# Patient Record
Sex: FEMALE | Race: OTHER | Employment: FULL TIME | URBAN - METROPOLITAN AREA
[De-identification: names, ages, dates, MRNs, and addresses within clinical notes are randomized per-mention and may not be internally consistent; named-entity substitution may affect disease eponyms.]

---

## 2024-07-10 LAB
ABO, EXTERNAL RESULT: NORMAL
C. TRACHOMATIS, EXTERNAL RESULT: NORMAL
HEP B, EXTERNAL RESULT: NORMAL
HIV, EXTERNAL RESULT: NORMAL
N. GONORRHOEAE, EXTERNAL RESULT: NORMAL
RH FACTOR, EXTERNAL RESULT: POSITIVE
RPR, EXTERNAL RESULT: NORMAL
RUBELLA TITER, EXTERNAL RESULT: NORMAL

## 2024-07-28 ENCOUNTER — HOSPITAL ENCOUNTER (EMERGENCY)
Facility: HOSPITAL | Age: 29
Discharge: HOME | End: 2024-07-29
Attending: STUDENT IN AN ORGANIZED HEALTH CARE EDUCATION/TRAINING PROGRAM
Payer: OTHER GOVERNMENT

## 2024-07-28 DIAGNOSIS — R11.2 NAUSEA AND VOMITING, UNSPECIFIED VOMITING TYPE: Primary | ICD-10-CM

## 2024-07-28 LAB
ALBUMIN SERPL-MCNC: 3.1 G/DL (ref 3.5–5)
ALBUMIN/GLOB SERPL: 0.7 (ref 1.1–2.2)
ALP SERPL-CCNC: 75 U/L (ref 45–117)
ALT SERPL-CCNC: 24 U/L (ref 12–78)
ANION GAP SERPL CALC-SCNC: 10 MMOL/L (ref 5–15)
AST SERPL W P-5'-P-CCNC: 47 U/L (ref 15–37)
BASOPHILS # BLD: 0 K/UL (ref 0–0.1)
BASOPHILS NFR BLD: 0 % (ref 0–1)
BILIRUB SERPL-MCNC: 0.5 MG/DL (ref 0.2–1)
BUN SERPL-MCNC: 8 MG/DL (ref 6–20)
BUN/CREAT SERPL: 16 (ref 12–20)
CA-I BLD-MCNC: 9.2 MG/DL (ref 8.5–10.1)
CHLORIDE SERPL-SCNC: 107 MMOL/L (ref 97–108)
CO2 SERPL-SCNC: 18 MMOL/L (ref 21–32)
CREAT SERPL-MCNC: 0.5 MG/DL (ref 0.55–1.02)
DIFFERENTIAL METHOD BLD: ABNORMAL
EOSINOPHIL # BLD: 0 K/UL (ref 0–0.4)
EOSINOPHIL NFR BLD: 0 % (ref 0–7)
ERYTHROCYTE [DISTWIDTH] IN BLOOD BY AUTOMATED COUNT: 13.1 % (ref 11.5–14.5)
GLOBULIN SER CALC-MCNC: 4.2 G/DL (ref 2–4)
GLUCOSE SERPL-MCNC: 97 MG/DL (ref 65–100)
HCT VFR BLD AUTO: 38.4 % (ref 35–47)
HGB BLD-MCNC: 12.9 G/DL (ref 11.5–16)
IMM GRANULOCYTES # BLD AUTO: 0 K/UL (ref 0–0.04)
IMM GRANULOCYTES NFR BLD AUTO: 0 % (ref 0–0.5)
LIPASE SERPL-CCNC: 14 U/L (ref 13–75)
LYMPHOCYTES # BLD: 0.3 K/UL (ref 0.8–3.5)
LYMPHOCYTES NFR BLD: 4 % (ref 12–49)
MAGNESIUM SERPL-MCNC: 2.1 MG/DL (ref 1.6–2.4)
MCH RBC QN AUTO: 28.4 PG (ref 26–34)
MCHC RBC AUTO-ENTMCNC: 33.6 G/DL (ref 30–36.5)
MCV RBC AUTO: 84.6 FL (ref 80–99)
MONOCYTES # BLD: 0.8 K/UL (ref 0–1)
MONOCYTES NFR BLD: 9 % (ref 5–13)
NEUTS SEG # BLD: 7.4 K/UL (ref 1.8–8)
NEUTS SEG NFR BLD: 87 % (ref 32–75)
NRBC # BLD: 0 K/UL (ref 0–0.01)
NRBC BLD-RTO: 0 PER 100 WBC
PLATELET # BLD AUTO: 226 K/UL (ref 150–400)
PMV BLD AUTO: 11.4 FL (ref 8.9–12.9)
POTASSIUM SERPL-SCNC: 4.7 MMOL/L (ref 3.5–5.1)
PROT SERPL-MCNC: 7.3 G/DL (ref 6.4–8.2)
RBC # BLD AUTO: 4.54 M/UL (ref 3.8–5.2)
SODIUM SERPL-SCNC: 135 MMOL/L (ref 136–145)
WBC # BLD AUTO: 8.5 K/UL (ref 3.6–11)

## 2024-07-28 PROCEDURE — 83690 ASSAY OF LIPASE: CPT

## 2024-07-28 PROCEDURE — 36415 COLL VENOUS BLD VENIPUNCTURE: CPT

## 2024-07-28 PROCEDURE — 6370000000 HC RX 637 (ALT 250 FOR IP): Performed by: STUDENT IN AN ORGANIZED HEALTH CARE EDUCATION/TRAINING PROGRAM

## 2024-07-28 PROCEDURE — 80053 COMPREHEN METABOLIC PANEL: CPT

## 2024-07-28 PROCEDURE — 83735 ASSAY OF MAGNESIUM: CPT

## 2024-07-28 PROCEDURE — 96374 THER/PROPH/DIAG INJ IV PUSH: CPT

## 2024-07-28 PROCEDURE — 96375 TX/PRO/DX INJ NEW DRUG ADDON: CPT

## 2024-07-28 PROCEDURE — 6360000002 HC RX W HCPCS: Performed by: STUDENT IN AN ORGANIZED HEALTH CARE EDUCATION/TRAINING PROGRAM

## 2024-07-28 PROCEDURE — 96361 HYDRATE IV INFUSION ADD-ON: CPT

## 2024-07-28 PROCEDURE — 2580000003 HC RX 258: Performed by: STUDENT IN AN ORGANIZED HEALTH CARE EDUCATION/TRAINING PROGRAM

## 2024-07-28 PROCEDURE — 85025 COMPLETE CBC W/AUTO DIFF WBC: CPT

## 2024-07-28 PROCEDURE — 99284 EMERGENCY DEPT VISIT MOD MDM: CPT

## 2024-07-28 RX ORDER — 0.9 % SODIUM CHLORIDE 0.9 %
500 INTRAVENOUS SOLUTION INTRAVENOUS ONCE
Status: COMPLETED | OUTPATIENT
Start: 2024-07-28 | End: 2024-07-29

## 2024-07-28 RX ORDER — METOCLOPRAMIDE HYDROCHLORIDE 5 MG/ML
10 INJECTION INTRAMUSCULAR; INTRAVENOUS ONCE
Status: COMPLETED | OUTPATIENT
Start: 2024-07-28 | End: 2024-07-28

## 2024-07-28 RX ORDER — ONDANSETRON 2 MG/ML
4 INJECTION INTRAMUSCULAR; INTRAVENOUS ONCE
Status: COMPLETED | OUTPATIENT
Start: 2024-07-28 | End: 2024-07-28

## 2024-07-28 RX ORDER — 0.9 % SODIUM CHLORIDE 0.9 %
1000 INTRAVENOUS SOLUTION INTRAVENOUS ONCE
Status: COMPLETED | OUTPATIENT
Start: 2024-07-28 | End: 2024-07-28

## 2024-07-28 RX ORDER — ACETAMINOPHEN 500 MG
1000 TABLET ORAL
Status: DISCONTINUED | OUTPATIENT
Start: 2024-07-28 | End: 2024-07-29 | Stop reason: HOSPADM

## 2024-07-28 RX ADMIN — SODIUM CHLORIDE 500 ML: 9 INJECTION, SOLUTION INTRAVENOUS at 23:49

## 2024-07-28 RX ADMIN — METOCLOPRAMIDE 10 MG: 5 INJECTION, SOLUTION INTRAMUSCULAR; INTRAVENOUS at 23:50

## 2024-07-28 RX ADMIN — ONDANSETRON 4 MG: 2 INJECTION INTRAMUSCULAR; INTRAVENOUS at 22:45

## 2024-07-28 RX ADMIN — SODIUM CHLORIDE 1000 ML: 9 INJECTION, SOLUTION INTRAVENOUS at 22:45

## 2024-07-28 ASSESSMENT — PAIN - FUNCTIONAL ASSESSMENT: PAIN_FUNCTIONAL_ASSESSMENT: NONE - DENIES PAIN

## 2024-07-29 VITALS
DIASTOLIC BLOOD PRESSURE: 64 MMHG | SYSTOLIC BLOOD PRESSURE: 93 MMHG | HEIGHT: 61 IN | RESPIRATION RATE: 16 BRPM | HEART RATE: 63 BPM | BODY MASS INDEX: 32.1 KG/M2 | OXYGEN SATURATION: 97 % | TEMPERATURE: 98.7 F | WEIGHT: 170 LBS

## 2024-07-29 RX ORDER — ONDANSETRON 4 MG/1
4 TABLET, ORALLY DISINTEGRATING ORAL 3 TIMES DAILY PRN
Qty: 21 TABLET | Refills: 0 | Status: SHIPPED | OUTPATIENT
Start: 2024-07-29

## 2024-07-29 NOTE — ED TRIAGE NOTES
Pt having nausea and vomiting since yesterday evening.      Pt is 13 weeks pregnant at this time.

## 2024-07-29 NOTE — ED PROVIDER NOTES
[facility] EMERGENCY DEPT  EMERGENCY DEPARTMENT HISTORY AND PHYSICAL EXAM      Date: 7/28/2024  Patient Name: Akua Yao  MRN: [id number]  Birthdate 1995  Date of evaluation: 7/28/2024  Provider: Diego López MD   Note Started: 12:45 AM EDT 7/29/24    HISTORY OF PRESENT ILLNESS     Chief Complaint   Patient presents with    Nausea    Emesis       History Provided By: Patient    HPI: Akua Yao is a 29 y.o. female with PMHx as reviewed below presents for evaluation of nausea and vomiting.  Patient endorses having morning sickness during this pregnancy, but this usually involves nausea without vomiting.  Today was worse and she has been vomiting today has not been able to tolerate p.o.  No known sick contacts.  No new or different foods.  No interventions attempted at home.  No abdominal pain, no vaginal discharge or bleed    PAST MEDICAL HISTORY   Past Medical History:  History reviewed. No pertinent past medical history.    Past Surgical History:  Past Surgical History:   Procedure Laterality Date    BREAST REDUCTION SURGERY Bilateral 01/16/2023       Family History:  History reviewed. No pertinent family history.    Social History:  Social History     Tobacco Use    Smoking status: Never    Smokeless tobacco: Never   Vaping Use    Vaping Use: Never used   Substance Use Topics    Alcohol use: Not Currently    Drug use: Never       Allergies:  No Known Allergies    PCP: No primary care provider on file.    Current Meds:   Current Facility-Administered Medications   Medication Dose Route Frequency Provider Last Rate Last Admin    sodium chloride 0.9 % bolus 500 mL  500 mL IntraVENous Once Diego López .9 mL/hr at 07/28/24 2349 500 mL at 07/28/24 2349     Current Outpatient Medications   Medication Sig Dispense Refill    ondansetron (ZOFRAN-ODT) 4 MG disintegrating tablet Take 1 tablet by mouth 3 times daily as needed for Nausea or Vomiting 21 tablet 0       Social Determinants of Health:

## 2024-07-29 NOTE — ED NOTES
Pt reports feeling much better after second bolus and reglan. PO challenge passed this time. Ambulated with significant other to lobby at this time.

## 2024-07-29 NOTE — DISCHARGE INSTRUCTIONS
Calcium 9.2 8.5 - 10.1 mg/dL    Total Bilirubin 0.5 0.2 - 1.0 mg/dL    AST 47 (H) 15 - 37 U/L    ALT 24 12 - 78 U/L    Alk Phosphatase 75 45 - 117 U/L    Total Protein 7.3 6.4 - 8.2 g/dL    Albumin 3.1 (L) 3.5 - 5.0 g/dL    Globulin 4.2 (H) 2.0 - 4.0 g/dL    Albumin/Globulin Ratio 0.7 (L) 1.1 - 2.2     Lipase    Collection Time: 07/28/24 10:11 PM   Result Value Ref Range    Lipase 14 13 - 75 U/L   Magnesium    Collection Time: 07/28/24 10:11 PM   Result Value Ref Range    Magnesium 2.1 1.6 - 2.4 mg/dL       Radiologic Studies  No orders to display     ------------------------------------------------------------------------------------------------------------  The evaluation and treatment you received in the Emergency Department were for an urgent problem. It is important that you follow-up with a doctor, nurse practitioner, or physician assistant to:  (1) confirm your diagnosis,  (2) re-evaluation of changes in your illness and treatment, and (3) for ongoing care. Please take your discharge instructions with you when you go to your follow-up appointment.     If you have any problem arranging a follow-up appointment, contact us!  If your symptoms become worse or you do not improve as expected, please return to us. We are available 24 hours a day.     If a prescription has been provided, please fill it as soon as possible to prevent a delay in treatment. If you have any questions or reservations about taking the medication due to side effects or interactions with other medications, please call your primary care provider or contact us directly.  Again, THANK YOU for choosing us to care for YOU!

## 2025-01-09 LAB — GBS, EXTERNAL RESULT: NORMAL

## 2025-01-30 ENCOUNTER — HOSPITAL ENCOUNTER (INPATIENT)
Facility: HOSPITAL | Age: 30
LOS: 3 days | Discharge: HOME | End: 2025-02-02
Attending: OBSTETRICS & GYNECOLOGY | Admitting: OBSTETRICS & GYNECOLOGY
Payer: OTHER GOVERNMENT

## 2025-01-30 PROBLEM — O47.9 UTERINE CONTRACTIONS: Status: ACTIVE | Noted: 2025-01-30

## 2025-01-30 LAB
ABO + RH BLD: NORMAL
BLOOD GROUP ANTIBODIES SERPL: NORMAL
ERYTHROCYTE [DISTWIDTH] IN BLOOD BY AUTOMATED COUNT: 15.1 % (ref 11.5–14.5)
HCT VFR BLD AUTO: 37.4 % (ref 35–47)
HGB BLD-MCNC: 11.8 G/DL (ref 11.5–16)
MCH RBC QN AUTO: 27.9 PG (ref 26–34)
MCHC RBC AUTO-ENTMCNC: 31.6 G/DL (ref 30–36.5)
MCV RBC AUTO: 88.4 FL (ref 80–99)
NRBC # BLD: 0 K/UL (ref 0–0.01)
NRBC BLD-RTO: 0 PER 100 WBC
PLATELET # BLD AUTO: 152 K/UL (ref 150–400)
RBC # BLD AUTO: 4.23 M/UL (ref 3.8–5.2)
RPR SER QL: NONREACTIVE
SPECIMEN EXP DATE BLD: NORMAL
WBC # BLD AUTO: 7.5 K/UL (ref 3.6–11)

## 2025-01-30 PROCEDURE — 86592 SYPHILIS TEST NON-TREP QUAL: CPT

## 2025-01-30 PROCEDURE — 86900 BLOOD TYPING SEROLOGIC ABO: CPT

## 2025-01-30 PROCEDURE — 4500000002 HC ER NO CHARGE

## 2025-01-30 PROCEDURE — 7210000100 HC LABOR FEE PER 1 HR

## 2025-01-30 PROCEDURE — 1100000000 HC RM PRIVATE

## 2025-01-30 PROCEDURE — 86850 RBC ANTIBODY SCREEN: CPT

## 2025-01-30 PROCEDURE — 86901 BLOOD TYPING SEROLOGIC RH(D): CPT

## 2025-01-30 PROCEDURE — 99283 EMERGENCY DEPT VISIT LOW MDM: CPT

## 2025-01-30 PROCEDURE — 36415 COLL VENOUS BLD VENIPUNCTURE: CPT

## 2025-01-30 PROCEDURE — 85027 COMPLETE CBC AUTOMATED: CPT

## 2025-01-30 PROCEDURE — 10907ZC DRAINAGE OF AMNIOTIC FLUID, THERAPEUTIC FROM PRODUCTS OF CONCEPTION, VIA NATURAL OR ARTIFICIAL OPENING: ICD-10-PCS | Performed by: MIDWIFE

## 2025-01-30 RX ORDER — SODIUM CHLORIDE 0.9 % (FLUSH) 0.9 %
5-40 SYRINGE (ML) INJECTION EVERY 12 HOURS SCHEDULED
Status: DISCONTINUED | OUTPATIENT
Start: 2025-01-30 | End: 2025-02-01

## 2025-01-30 RX ORDER — ACETAMINOPHEN 325 MG/1
650 TABLET ORAL EVERY 4 HOURS PRN
Status: DISCONTINUED | OUTPATIENT
Start: 2025-01-30 | End: 2025-02-01

## 2025-01-30 RX ORDER — TERBUTALINE SULFATE 1 MG/ML
0.25 INJECTION, SOLUTION SUBCUTANEOUS
Status: ACTIVE | OUTPATIENT
Start: 2025-01-30 | End: 2025-01-31

## 2025-01-30 RX ORDER — SODIUM CHLORIDE 9 MG/ML
25 INJECTION, SOLUTION INTRAVENOUS PRN
Status: DISCONTINUED | OUTPATIENT
Start: 2025-01-30 | End: 2025-02-01

## 2025-01-30 RX ORDER — MAGNESIUM CARB/ALUMINUM HYDROX 105-160MG
TABLET,CHEWABLE ORAL PRN
Status: DISCONTINUED | OUTPATIENT
Start: 2025-01-30 | End: 2025-02-01

## 2025-01-30 RX ORDER — SODIUM CHLORIDE 0.9 % (FLUSH) 0.9 %
5-40 SYRINGE (ML) INJECTION PRN
Status: DISCONTINUED | OUTPATIENT
Start: 2025-01-30 | End: 2025-02-01

## 2025-01-30 NOTE — PROGRESS NOTES
~1645: The patient arrived from home with reports of contractions since 1245 after her OB appointment. The patient denies leaking of fluid or vaginal bleeding and reports active fetal movement. The patient and her significant other (Todd) are escorted to IM[facility] 3.  ~1701: Dr Drew is called to inform her of the patient's arrival. No answer. Will try again.  ~1714: Spoke with Dr Drew via phone.he is in the ED seeing a patient and is unable to see the patient. Ok for the nurse to do SVE and call with an update.  ~1717: SVE done; 5/70/-3. Nurse will call Dr Drew with an update.  ~1719: Dr Drew is updated on SVE. Telephone order is received to transfer the patient to a labor room.  ~1721: External monitors are removed in preparation for transfer to labor room.  ~1725: The patient is transferred ambulatory to L&D 2.  ~1732: TRANSFER - OUT REPORT:    Verbal report given to MERRILL Sue RN on [facility]  being transferred to L&D 2 for routine progression of patient care       Report consisted of patient's Situation, Background, Assessment and   Recommendations(SBAR).     Information from the following report(s) Nurse Handoff Report was reviewed with the receiving nurse.           Lines:       Opportunity for questions and clarification was provided.      Patient transported with:  Registered Nurse

## 2025-01-30 NOTE — H&P
IMANI Note  2025    29 y.o., , female, G3 P 2 Estimated Date of Delivery: 25 by dates and US presents with increasing contractions since this afternoon.  She was scheduled for EIOL tomorrow by [facility]    She was seen in the office today and was 4 cm dilated  GBS negative    Since I was in the ER seeing a pt, I asked Ms. Merrill IMANI RN to check her as she mentioned that pt was increasingly uncomfortable with contractions   She was 5 cm dilated per RN and hence advised her to admit pt to L &D     ,    PNC: Blood type: O            RH: pos            Rubella:             SVII serology: neg             GBS status: neg  Past Medical History:   Diagnosis Date    Abnormal Pap smear of cervix     follow-up WNL     Past Surgical History:   Procedure Laterality Date    BREAST REDUCTION SURGERY Bilateral 2023     [facility]GYN: [facility]  Meds:   No current facility-administered medications for this encounter.     Allergies: No Known Allergies  Pertinent ROS: per HPI   History reviewed. No pertinent family history.  Social History     Socioeconomic History    Marital status:      Spouse name: Not on file    Number of children: Not on file    Years of education: Not on file    Highest education level: Not on file   Occupational History    Not on file   Tobacco Use    Smoking status: Never    Smokeless tobacco: Never   Vaping Use    Vaping status: Never Used   Substance and Sexual Activity    Alcohol use: Not Currently    Drug use: Never    Sexual activity: Yes     Partners: Male   Other Topics Concern    Not on file   Social History Narrative    Not on file     Social Determinants of Health     Financial Resource Strain: Not on file   Food Insecurity: Not on file   Transportation Needs: Not on file   Physical Activity: Not on file   Stress: Not on file   Social Connections: Not on file   Intimate Partner Violence: Not on file   Housing Stability: Not on file 
  Social Connections: Not on file   Intimate Partner Violence: Not on file   Housing Stability: Not on file            OBJECTIVE:  Gravid 2P1, female NAD  Temp (24hrs), Av.4 °F (36.9 °C), Min:98.4 °F (36.9 °C), Max:98.4 °F (36.9 °C)     /60   Pulse 75   Temp 98.4 °F (36.9 °C) (Oral)   Resp 16   Ht 1.549 m (5' 1\")   Wt 88 kg (194 lb)   BMI 36.66 kg/m²      Labs:          Lab Results   Component Value Date/Time     WBC 8.5 2024 10:11 PM     HGB 12.9 2024 10:11 PM     HCT 38.4 2024 10:11 PM      2024 10:11 PM     BUN 8 2024 10:11 PM         Exam:  Alert and oriented  HEENT:  normal   Lungs:  clear  Cor:  RRR  Abdomen:  Fundal height 40 cm                    Soft between UC                   Fetal heart rate tracin, +accels, no decels, moderate variability  Contraction pattern: every 4-5 mins  Cervix:  5 cm per RN  Fluid:  Intact     Impression:  IUP at 39  weeks 5 days in early labor  Admit to L &D  GBS negative  Desires low intervention        Yesenia Drew MD

## 2025-01-31 PROCEDURE — 2580000003 HC RX 258: Performed by: ADVANCED PRACTICE MIDWIFE

## 2025-01-31 PROCEDURE — 1100000000 HC RM PRIVATE

## 2025-01-31 PROCEDURE — 6360000002 HC RX W HCPCS: Performed by: ADVANCED PRACTICE MIDWIFE

## 2025-01-31 PROCEDURE — 7210000100 HC LABOR FEE PER 1 HR

## 2025-01-31 RX ORDER — ONDANSETRON 4 MG/1
4 TABLET, ORALLY DISINTEGRATING ORAL EVERY 6 HOURS PRN
Status: DISCONTINUED | OUTPATIENT
Start: 2025-01-31 | End: 2025-02-01 | Stop reason: SDUPTHER

## 2025-01-31 RX ORDER — SODIUM CHLORIDE, SODIUM LACTATE, POTASSIUM CHLORIDE, AND CALCIUM CHLORIDE .6; .31; .03; .02 G/100ML; G/100ML; G/100ML; G/100ML
500 INJECTION, SOLUTION INTRAVENOUS PRN
Status: DISCONTINUED | OUTPATIENT
Start: 2025-01-31 | End: 2025-02-01

## 2025-01-31 RX ORDER — SODIUM CHLORIDE, SODIUM LACTATE, POTASSIUM CHLORIDE, CALCIUM CHLORIDE 600; 310; 30; 20 MG/100ML; MG/100ML; MG/100ML; MG/100ML
INJECTION, SOLUTION INTRAVENOUS CONTINUOUS
Status: DISCONTINUED | OUTPATIENT
Start: 2025-01-31 | End: 2025-02-01

## 2025-01-31 RX ORDER — LIDOCAINE HYDROCHLORIDE 10 MG/ML
INJECTION, SOLUTION INFILTRATION; PERINEURAL
Status: DISCONTINUED
Start: 2025-01-31 | End: 2025-02-01 | Stop reason: WASHOUT

## 2025-01-31 RX ORDER — NALBUPHINE HYDROCHLORIDE 10 MG/ML
10 INJECTION INTRAMUSCULAR; INTRAVENOUS; SUBCUTANEOUS
Status: DISCONTINUED | OUTPATIENT
Start: 2025-01-31 | End: 2025-02-01

## 2025-01-31 RX ORDER — MISOPROSTOL 200 UG/1
400 TABLET ORAL PRN
Status: DISCONTINUED | OUTPATIENT
Start: 2025-01-31 | End: 2025-02-01

## 2025-01-31 RX ORDER — CARBOPROST TROMETHAMINE 250 UG/ML
250 INJECTION, SOLUTION INTRAMUSCULAR PRN
Status: DISCONTINUED | OUTPATIENT
Start: 2025-01-31 | End: 2025-02-01

## 2025-01-31 RX ORDER — METHYLERGONOVINE MALEATE 0.2 MG/ML
200 INJECTION INTRAVENOUS PRN
Status: DISCONTINUED | OUTPATIENT
Start: 2025-01-31 | End: 2025-02-01

## 2025-01-31 RX ORDER — ONDANSETRON 2 MG/ML
4 INJECTION INTRAMUSCULAR; INTRAVENOUS EVERY 6 HOURS PRN
Status: DISCONTINUED | OUTPATIENT
Start: 2025-01-31 | End: 2025-02-01 | Stop reason: SDUPTHER

## 2025-01-31 RX ADMIN — SODIUM CHLORIDE, POTASSIUM CHLORIDE, SODIUM LACTATE AND CALCIUM CHLORIDE: 600; 310; 30; 20 INJECTION, SOLUTION INTRAVENOUS at 04:40

## 2025-01-31 RX ADMIN — NALBUPHINE HYDROCHLORIDE 10 MG: 10 INJECTION, SOLUTION INTRAMUSCULAR; INTRAVENOUS; SUBCUTANEOUS at 04:23

## 2025-01-31 NOTE — PROGRESS NOTES
CNM Labor Progress Note     Patient: Akua Yao MRN: [id number]  SSN: [id number]    YOB: 1995  Age: 29 y.o.  Sex: female        Subjective:   Pt in tub on provider arrival.  Coping well and breathing through contractions.   and partner at bedside for support.  Agrees to sve       Objective:   Patient Vitals for the past 4 hrs:   Temp Pulse Resp BP   25 1112 98.4 °F (36.9 °C) 85 20 136/61       Cervical Exam: 7 cm dilated    70% effaced    -3 station  , ballotable  Presenting Part: cephalic  Membranes:  Intact  Fetal Heart Rate: reassuring      Assessment:   Intrauterine pregnancy at term  Category 1 fetal heart rate tracing         Plan:   Unable to AROM d/t ballotable fetal head.    Expectant mgmt  Continue current orders/management   CNM management   Anticipate     DARIUS Peterson - WALLY

## 2025-01-31 NOTE — PROGRESS NOTES
Labor Progress Note    S: Patient seen, fetal heart rate and contraction pattern evaluated.     Physical Exam:  Patient Vitals for the past 4 hrs:   Temp Pulse Resp BP SpO2   25 1950 98.2 °F (36.8 °C) 79 18 126/68 98 %         Cervical Exam: not indicated at this time  Membranes:  Intact  Uterine Contractions:  Frequency: Irregular  Fetal Heart Rate: Reactive      Assessment/Plan:    29 y.o.  at 39w5d IUP    Cat 1 tracing  GBS negative    P:  CNM in room assessing patient, patient resting in bed comfortably.  Patient states that she would like low intervention and would like to ambulate.  CNM verbalized understanding.     DARIUS Haskins - WALLY

## 2025-01-31 NOTE — PLAN OF CARE
Problem: Pain  Goal: Verbalizes/displays adequate comfort level or baseline comfort level  1/30/2025 2014 by Anita Mcclain RN  Flowsheets (Taken 1/30/2025 2014)  Verbalizes/displays adequate comfort level or baseline comfort level:   Encourage patient to monitor pain and request assistance   Consider cultural and social influences on pain and pain management   Administer analgesics based on type and severity of pain and evaluate response   Assess pain using appropriate pain scale   Implement non-pharmacological measures as appropriate and evaluate response   Notify Licensed Independent Practitioner if interventions unsuccessful or patient reports new pain  1/30/2025 1757 by Caitlin Sue RN  Outcome: Progressing     Problem: Infection - Adult  Goal: Absence of infection at discharge  1/30/2025 2014 by Anita Mcclain RN  Flowsheets (Taken 1/30/2025 2014)  Absence of infection at discharge:   Assess and monitor for signs and symptoms of infection   Monitor all insertion sites i.e., indwelling lines, tubes and drains   Instruct and encourage patient and family to use good hand hygiene technique   Identify and instruct in appropriate isolation precautions for identified infection/condition   Administer medications as ordered  1/30/2025 1757 by Caitlin Sue, RN  Outcome: Progressing  Goal: Absence of infection during hospitalization  1/30/2025 2014 by Anita Mcclain RN  Flowsheets (Taken 1/30/2025 2014)  Absence of infection during hospitalization:   Assess and monitor for signs and symptoms of infection   Instruct and encourage patient and family to use good hand hygiene technique   Identify and instruct in appropriate isolation precautions for identified infection/condition   Administer medications as ordered   Monitor all insertion sites i.e., indwelling lines, tubes and drains  1/30/2025 1757 by Caitlin Sue, RN  Outcome: Progressing     Problem: Safety - Adult  Goal: Free from fall

## 2025-01-31 NOTE — PROGRESS NOTES
1940: BSR received from MERRILL Sue RN. Pt has  at bedside.  on his way to bringing food. Pt is coping well laboring in bed. Plan of care reviewed with pt. NST completed and reactive. Educated pt in fall precautions and room safety. Pt has call bell near by. Will let pt lamaze with  and . Pt educated on when to call out for RN. Will do spot checks Q1hr. Pt is low intervention.     2030 RN asked pt if the have decided on whether or not they will do cord banking. Pt and husbamd have stated that they will do cord blood banking. Cord blood kit brought to parents. Pt and  is setting up their account and registering their kit.     2125: Maternal labs for cord banking collected.     2140: Arlyn LO at bedside updating pt on plan of care.      2225: Pt placed on Adriana/Novii to minimize interventions for spot checks     0400: ERNA Smith CNM at bedside with V scan. Baby head SVE 5cm/ 100%/ -2 with a bulging bag     0423: Nubain administered for pain. Pt on continuous pulse ox.    0730: Bedside report given to  ERNA Hathaway RN   .  Report included the following information Nurse Handoff Report, MAR, and Recent Results.

## 2025-01-31 NOTE — PROGRESS NOTES
CNM Labor Progress Note     Patient: Akua Yao MRN: [id number]  SSN: [id number]    YOB: 1995  Age: 29 y.o.  Sex: female        Subjective:   Pt has been coping w/ ucs.   and partner in attendance.  Pt desires low intervention labor / birth.  Declines sve / AROM at this time.         Objective:   Patient Vitals for the past 4 hrs:   Temp Pulse BP SpO2   25 0758 98 °F (36.7 °C) 82 114/67 98 %       Cervical Exam: declined  Membranes:  Intact  Fetal Heart Rate: Baseline: 115 per minute  Variability: moderate  Accelerations: yes  Decelerations: none  Uterine contractions: irregular, resting tone soft      Assessment:   Intrauterine pregnancy at term  Category 1 fetal heart rate tracing         Plan:   Continue current orders/management   Recheck cervix in a few hours and see if pt agreeable to ROM.  If not, may consider DC to home  CNM management   Anticipate     Jorge Burgess, DARIUS - WALLY

## 2025-01-31 NOTE — PROGRESS NOTES
@0730 Bedside shift change report given to ERNA Hathaway RN (oncoming nurse) by ERNA Mcclain RN (offgoing nurse). Report included the following information Index, Intake/Output, MAR, and Recent Results.      @0758 Patient is low intervention, coping well, NOVII monitor not working well at the moment. Will get some heart tones and switch to intermittent monitoring at this time.    @0805 Patient up and doing positional changes with  and partner. Coping well.    @0830 Patient up on birthing ball, coping well      @0915 Patient standing up, swaying, well support by partner and .     @0951 Patient sitting up in bed, eating breakfast. Will get an NST during this time.     @1005 Fetus is very active, both audible and palpated movements! Patient has an anterior placenta making monitoring more difficult.     @3136-1444 RN at bedside holding on monitors to try to get an NST, fetus is moving a ton! Audible accelerations noted, even with broken tracing, early decel noted at 1026. Patient resting comfortably in bed. Will try NOVII again with next NST.     @1114 Patient coping well, on birthing ball, would like to try the tub soon. Will get it ready.     @1158 Patient coping well in the tub. FHT obtained before, during,after contraction. Early noted with contraction. Lots of fetal movement.     @1228 SVE by ERNA Burgess CNM 7/90/-3    @1230 Patient placed in Walcher's position x 3 contractions    @1300 Patient in knee chest over cub, bed in [facility]. Bebo instructed on how to do \"shaking the apples\"    @1340 Patient up walking in the room, coping well.    @1400 Patient walking the halls with partner, hurting more but coping well.     @1430 ERNA Burgess updated on patient's increased discomfort and feeling some intermittent pressure but patient does not feel urge to push yet. No new orders.    @1445 Patient swaying at bedside, doing squats using the grey handrail on the side of the bed    @1500 Patient up in throne

## 2025-02-01 ENCOUNTER — ANESTHESIA EVENT (OUTPATIENT)
Facility: HOSPITAL | Age: 30
End: 2025-02-01
Payer: OTHER GOVERNMENT

## 2025-02-01 ENCOUNTER — ANESTHESIA (OUTPATIENT)
Facility: HOSPITAL | Age: 30
End: 2025-02-01
Payer: OTHER GOVERNMENT

## 2025-02-01 PROCEDURE — 3700000025 EPIDURAL BLOCK: Performed by: STUDENT IN AN ORGANIZED HEALTH CARE EDUCATION/TRAINING PROGRAM

## 2025-02-01 PROCEDURE — 7100000000 HC PACU RECOVERY - FIRST 15 MIN

## 2025-02-01 PROCEDURE — 2500000003 HC RX 250 WO HCPCS: Performed by: STUDENT IN AN ORGANIZED HEALTH CARE EDUCATION/TRAINING PROGRAM

## 2025-02-01 PROCEDURE — 6360000002 HC RX W HCPCS: Performed by: MIDWIFE

## 2025-02-01 PROCEDURE — 51702 INSERT TEMP BLADDER CATH: CPT

## 2025-02-01 PROCEDURE — 6370000000 HC RX 637 (ALT 250 FOR IP): Performed by: MIDWIFE

## 2025-02-01 PROCEDURE — 00HU33Z INSERTION OF INFUSION DEVICE INTO SPINAL CANAL, PERCUTANEOUS APPROACH: ICD-10-PCS | Performed by: STUDENT IN AN ORGANIZED HEALTH CARE EDUCATION/TRAINING PROGRAM

## 2025-02-01 PROCEDURE — 7220000101 HC DELIVERY VAGINAL/SINGLE

## 2025-02-01 PROCEDURE — 1120000000 HC RM PRIVATE OB

## 2025-02-01 PROCEDURE — 7210000100 HC LABOR FEE PER 1 HR

## 2025-02-01 PROCEDURE — 6360000002 HC RX W HCPCS: Performed by: ADVANCED PRACTICE MIDWIFE

## 2025-02-01 PROCEDURE — 51701 INSERT BLADDER CATHETER: CPT

## 2025-02-01 PROCEDURE — 7100000001 HC PACU RECOVERY - ADDTL 15 MIN

## 2025-02-01 PROCEDURE — 6360000002 HC RX W HCPCS: Performed by: STUDENT IN AN ORGANIZED HEALTH CARE EDUCATION/TRAINING PROGRAM

## 2025-02-01 RX ORDER — EPHEDRINE SULFATE 50 MG/ML
INJECTION INTRAVENOUS
Status: DISCONTINUED
Start: 2025-02-01 | End: 2025-02-01 | Stop reason: WASHOUT

## 2025-02-01 RX ORDER — SODIUM CHLORIDE 0.9 % (FLUSH) 0.9 %
5-40 SYRINGE (ML) INJECTION EVERY 12 HOURS SCHEDULED
Status: DISCONTINUED | OUTPATIENT
Start: 2025-02-01 | End: 2025-02-02 | Stop reason: HOSPADM

## 2025-02-01 RX ORDER — EPHEDRINE SULFATE 50 MG/ML
5 INJECTION INTRAVENOUS PRN
Status: DISCONTINUED | OUTPATIENT
Start: 2025-02-02 | End: 2025-02-01

## 2025-02-01 RX ORDER — ACETAMINOPHEN 500 MG
1000 TABLET ORAL EVERY 8 HOURS SCHEDULED
Status: DISCONTINUED | OUTPATIENT
Start: 2025-02-01 | End: 2025-02-02 | Stop reason: HOSPADM

## 2025-02-01 RX ORDER — SODIUM CHLORIDE 0.9 % (FLUSH) 0.9 %
5-40 SYRINGE (ML) INJECTION PRN
Status: DISCONTINUED | OUTPATIENT
Start: 2025-02-01 | End: 2025-02-02 | Stop reason: HOSPADM

## 2025-02-01 RX ORDER — OXYCODONE HYDROCHLORIDE 5 MG/1
5 TABLET ORAL EVERY 4 HOURS PRN
Status: DISCONTINUED | OUTPATIENT
Start: 2025-02-01 | End: 2025-02-02 | Stop reason: HOSPADM

## 2025-02-01 RX ORDER — ONDANSETRON 4 MG/1
4 TABLET, ORALLY DISINTEGRATING ORAL EVERY 6 HOURS PRN
Status: DISCONTINUED | OUTPATIENT
Start: 2025-02-01 | End: 2025-02-02 | Stop reason: HOSPADM

## 2025-02-01 RX ORDER — SODIUM CHLORIDE 9 MG/ML
INJECTION, SOLUTION INTRAVENOUS PRN
Status: DISCONTINUED | OUTPATIENT
Start: 2025-02-01 | End: 2025-02-02 | Stop reason: HOSPADM

## 2025-02-01 RX ORDER — NALOXONE HYDROCHLORIDE 0.4 MG/ML
INJECTION, SOLUTION INTRAMUSCULAR; INTRAVENOUS; SUBCUTANEOUS PRN
Status: DISCONTINUED | OUTPATIENT
Start: 2025-02-01 | End: 2025-02-01

## 2025-02-01 RX ORDER — DIPHENHYDRAMINE HCL 25 MG
25 CAPSULE ORAL EVERY 6 HOURS PRN
Status: DISCONTINUED | OUTPATIENT
Start: 2025-02-01 | End: 2025-02-01

## 2025-02-01 RX ORDER — ONDANSETRON 2 MG/ML
4 INJECTION INTRAMUSCULAR; INTRAVENOUS EVERY 6 HOURS PRN
Status: DISCONTINUED | OUTPATIENT
Start: 2025-02-01 | End: 2025-02-02 | Stop reason: HOSPADM

## 2025-02-01 RX ORDER — LIDOCAINE HCL/EPINEPHRINE/PF 2%-1:200K
VIAL (ML) INJECTION
Status: COMPLETED
Start: 2025-02-01 | End: 2025-02-01

## 2025-02-01 RX ORDER — LIDOCAINE HCL/EPINEPHRINE/PF 2%-1:200K
VIAL (ML) INJECTION
Status: DISCONTINUED | OUTPATIENT
Start: 2025-02-01 | End: 2025-02-01 | Stop reason: SDUPTHER

## 2025-02-01 RX ORDER — FENTANYL CITRATE 50 UG/ML
INJECTION, SOLUTION INTRAMUSCULAR; INTRAVENOUS
Status: DISCONTINUED | OUTPATIENT
Start: 2025-02-01 | End: 2025-02-01 | Stop reason: SDUPTHER

## 2025-02-01 RX ORDER — EPHEDRINE SULFATE 50 MG/ML
10 INJECTION INTRAVENOUS
Status: DISCONTINUED | OUTPATIENT
Start: 2025-02-01 | End: 2025-02-01

## 2025-02-01 RX ORDER — FENTANYL CITRATE 50 UG/ML
INJECTION, SOLUTION INTRAMUSCULAR; INTRAVENOUS
Status: COMPLETED
Start: 2025-02-01 | End: 2025-02-01

## 2025-02-01 RX ORDER — FENTANYL CITRATE 50 UG/ML
INJECTION, SOLUTION INTRAMUSCULAR; INTRAVENOUS
Status: DISCONTINUED
Start: 2025-02-01 | End: 2025-02-01

## 2025-02-01 RX ORDER — BUPIVACAINE HYDROCHLORIDE 2.5 MG/ML
INJECTION, SOLUTION EPIDURAL; INFILTRATION; INTRACAUDAL
Status: DISCONTINUED | OUTPATIENT
Start: 2025-02-01 | End: 2025-02-01 | Stop reason: SDUPTHER

## 2025-02-01 RX ORDER — SODIUM CHLORIDE, SODIUM LACTATE, POTASSIUM CHLORIDE, CALCIUM CHLORIDE 600; 310; 30; 20 MG/100ML; MG/100ML; MG/100ML; MG/100ML
INJECTION, SOLUTION INTRAVENOUS CONTINUOUS
Status: DISCONTINUED | OUTPATIENT
Start: 2025-02-01 | End: 2025-02-02 | Stop reason: HOSPADM

## 2025-02-01 RX ORDER — DOCUSATE SODIUM 100 MG/1
100 CAPSULE, LIQUID FILLED ORAL 2 TIMES DAILY
Status: DISCONTINUED | OUTPATIENT
Start: 2025-02-01 | End: 2025-02-02 | Stop reason: HOSPADM

## 2025-02-01 RX ORDER — FENTANYL/BUPIVACAINE/NS/PF 2-1250MCG
1-15 PLASTIC BAG, INJECTION (ML) INJECTION CONTINUOUS
Status: DISCONTINUED | OUTPATIENT
Start: 2025-02-01 | End: 2025-02-01

## 2025-02-01 RX ORDER — IBUPROFEN 400 MG/1
800 TABLET, FILM COATED ORAL EVERY 8 HOURS SCHEDULED
Status: DISCONTINUED | OUTPATIENT
Start: 2025-02-01 | End: 2025-02-02 | Stop reason: HOSPADM

## 2025-02-01 RX ORDER — DIPHENHYDRAMINE HYDROCHLORIDE 50 MG/ML
25 INJECTION INTRAMUSCULAR; INTRAVENOUS EVERY 6 HOURS PRN
Status: DISCONTINUED | OUTPATIENT
Start: 2025-02-01 | End: 2025-02-01

## 2025-02-01 RX ORDER — BUPIVACAINE HYDROCHLORIDE 2.5 MG/ML
INJECTION, SOLUTION EPIDURAL; INFILTRATION; INTRACAUDAL
Status: COMPLETED
Start: 2025-02-01 | End: 2025-02-01

## 2025-02-01 RX ORDER — MODIFIED LANOLIN
OINTMENT (GRAM) TOPICAL PRN
Status: DISCONTINUED | OUTPATIENT
Start: 2025-02-01 | End: 2025-02-02 | Stop reason: HOSPADM

## 2025-02-01 RX ADMIN — LIDOCAINE HYDROCHLORIDE,EPINEPHRINE BITARTRATE 5 ML: 20; .005 INJECTION, SOLUTION EPIDURAL; INFILTRATION; INTRACAUDAL; PERINEURAL at 12:40

## 2025-02-01 RX ADMIN — FENTANYL CITRATE 100 MCG: 50 INJECTION INTRAMUSCULAR; INTRAVENOUS at 12:40

## 2025-02-01 RX ADMIN — FENTANYL CITRATE 100 MCG: 50 INJECTION INTRAMUSCULAR; INTRAVENOUS at 07:15

## 2025-02-01 RX ADMIN — ONDANSETRON 4 MG: 2 INJECTION INTRAMUSCULAR; INTRAVENOUS at 04:32

## 2025-02-01 RX ADMIN — LIDOCAINE HYDROCHLORIDE,EPINEPHRINE BITARTRATE 3 ML: 20; .005 INJECTION, SOLUTION EPIDURAL; INFILTRATION; INTRACAUDAL; PERINEURAL at 00:22

## 2025-02-01 RX ADMIN — LIDOCAINE HYDROCHLORIDE,EPINEPHRINE BITARTRATE 5 ML: 20; .005 INJECTION, SOLUTION EPIDURAL; INFILTRATION; INTRACAUDAL; PERINEURAL at 04:58

## 2025-02-01 RX ADMIN — Medication 12 ML/HR: at 10:36

## 2025-02-01 RX ADMIN — DOCUSATE SODIUM 100 MG: 100 CAPSULE, LIQUID FILLED ORAL at 21:59

## 2025-02-01 RX ADMIN — Medication 10 ML/HR: at 00:46

## 2025-02-01 RX ADMIN — IBUPROFEN 800 MG: 400 TABLET, FILM COATED ORAL at 15:47

## 2025-02-01 RX ADMIN — Medication 166.7 ML: at 14:11

## 2025-02-01 RX ADMIN — FENTANYL CITRATE 100 MCG: 50 INJECTION INTRAMUSCULAR; INTRAVENOUS at 00:25

## 2025-02-01 RX ADMIN — LIDOCAINE HYDROCHLORIDE,EPINEPHRINE BITARTRATE 5 ML: 20; .005 INJECTION, SOLUTION EPIDURAL; INFILTRATION; INTRACAUDAL; PERINEURAL at 07:15

## 2025-02-01 RX ADMIN — LIDOCAINE HYDROCHLORIDE,EPINEPHRINE BITARTRATE 5 ML: 20; .005 INJECTION, SOLUTION EPIDURAL; INFILTRATION; INTRACAUDAL; PERINEURAL at 03:47

## 2025-02-01 RX ADMIN — FENTANYL CITRATE 100 MCG: 50 INJECTION INTRAMUSCULAR; INTRAVENOUS at 04:58

## 2025-02-01 RX ADMIN — IBUPROFEN 800 MG: 400 TABLET, FILM COATED ORAL at 21:58

## 2025-02-01 RX ADMIN — Medication 2 MILLI-UNITS/MIN: at 09:16

## 2025-02-01 RX ADMIN — LIDOCAINE HYDROCHLORIDE,EPINEPHRINE BITARTRATE 5 ML: 20; .005 INJECTION, SOLUTION EPIDURAL; INFILTRATION; INTRACAUDAL; PERINEURAL at 08:45

## 2025-02-01 RX ADMIN — BUPIVACAINE HYDROCHLORIDE 3 ML: 2.5 INJECTION, SOLUTION EPIDURAL; INFILTRATION; INTRACAUDAL; PERINEURAL at 00:27

## 2025-02-01 RX ADMIN — Medication 12 ML/HR: at 06:18

## 2025-02-01 ASSESSMENT — PAIN SCALES - GENERAL: PAINLEVEL_OUTOF10: 2

## 2025-02-01 ASSESSMENT — PAIN DESCRIPTION - LOCATION: LOCATION: ABDOMEN

## 2025-02-01 ASSESSMENT — PAIN DESCRIPTION - DESCRIPTORS: DESCRIPTORS: ACHING;CRAMPING

## 2025-02-01 ASSESSMENT — PAIN DESCRIPTION - ORIENTATION: ORIENTATION: LOWER

## 2025-02-01 NOTE — L&D DELIVERY NOTE
Called to patient's room to assess pushing by RN staff. Poor to fair effort and pain with epidural despite re-dose and numb legs. She pushed for approximately 2hrs with me. Variables and late decels with pushing. When FHR did not recover and bradycardia in 60s-70s remained, Dr. Gordon called to apply vacuum. Discussed risks with pt. To include increase in lacs, bleeding as well as hematoma or brain injury to fetus. She verbally consents. Dr Juares in room and Kiwi applied. Vacuum applied through 2 contractions and vertex delivered over supported perineum. Triple nuchal reduced. Anterior shoulder delivered with gentle downward traction from MD followed immediately by posterior shoulder and body. Terminal meconium noted. Cord double clamped and cut by MD. Infant handed off to await NICU at warmer. MD out of room to tend to more acute pt. In IMANI. Cord blood collected. Cord blood banking kit completed and handed off to nursing. Jomar placenta delivered intact. Pitocin IV per protocol. Vulva, vagina and perineum inspected and found to be edematous but intact. Roselia care completed. Mom and baby doing well, left stable and attended.        SIMIN Yao [[id number]]      Labor Events     Labor: No   Steroids: None  Cervical Ripening Date/Time:      Antibiotics Received during Labor: No  Rupture Date/Time:  25 20:48:00   Rupture Type: AROM, Intact  Fluid Color: Clear  Fluid Odor: None  Induction: None  Augmentation: AROM, Oxytocin  Labor Complications: Dysfunctional Labor       Anesthesia    Method: Nitrous Oxide, Epidural       Labor Event Times      Labor onset date/time:  25 12:45:00     Dilation complete date/time:  25 06:40:00     Start pushing date/time:  2025 11:45:00   Decision date/time (emergent ):            Delivery Details      Delivery Date: 25 Delivery Time: 14:05:00   Delivery Type: Vaginal, Vacuum (Extractor)               Presentation

## 2025-02-01 NOTE — PROGRESS NOTES
1930: Bedside shift change report given to ALISHA Rivas RN (oncoming nurse) by CAROLINE Hathaway RN (offgoing nurse). Report included the following information Nurse Handoff Report. Patient desires AROM, awaiting provider. Patient sitting on toilet backwards laboring- coping well with UCs  [id number]: Merna, RN at bedside  2048: A Carter LO at bedside SVE 8/90/-2,  AROM by CNM for large amount of clear fluid, pads changed  2058: Peanut ball lunge right side   2103: Out of bed, swaying and squatting with Ucs  2109: On bedside commode   2130: In shower,  and  in bathroom providing assistance. Patient tolerated contractions, starting to feel rectal pressure.   2210: Out of shower  [id number]: Intermittent monitoring completed per verbal orders, decel noted.   2229: SVE unchanged, IVFB initiated for FHR tracing and continuous monitors applied. Patient sitting up in bed.  2246: Sidelying release L  2253: Sidelying release R  2305: Knee chest, call to CNM to report SVE and decels.    2312: A Carter LO at bedside, patient consented to internal monitors.   2316: FSE inserted by CNM  2317: IUPC inserted by CNM  2328: Patient debating epidural  0005: Patent requesting epidural, fluid bolus infusing.   [id number]: Dr Roy at bedside for epidural placement, timeout completed.   0024: Left sidelying post epidural placement  0031: A Carter LO at bedside and reviewed FHR tracing  0046: Right sidelying with pillows, patient declines peanut ball at this time and desires rest.   0158: Left sidelying with pillows, patient resting  0332: Right sidelying with peanut ball   0337: Patient uncomfortable with contractions, has tried epidural bolus button and repositioning. SVE unchanged but -1. Call to Dr Roy for epidural redose.   0430: Patient remains uncomfortable with contractions, desires epidural rate increase as she had with her previous baby. Orders received to increase rate by Dr Roy.   0457: Dr Roy at bedside

## 2025-02-01 NOTE — LACTATION NOTE
This note was copied from a baby's chart.  Infant born vaginally with vacuum this morning to a  mom at 40 weeks gestation. Mom nursed her other 2 children and supplemented as she states she didn't have enough supply.   Mom states infant has latched a couple of times since birth. Infant had just eaten at the time of my visit and was sleeping.   Feeding Plan:  Mother will keep baby skin to skin as often as possible, feed on demand, 8-12x/day , respond to feeding cues, obtain latch, listen for audible swallowing, be aware of signs of oxytocin release/ cramping,thirst,sleepiness while breastfeeding, offer both breasts,and will not limit feedings.  Mother agrees to utilize breast massage while nursing to facilitate lactogenesis.

## 2025-02-01 NOTE — PROGRESS NOTES
Called to room by nursing with report of decels on efm.   Contractions spaced to q 8 min  Patient coping well. Also admits to being tired    VE: 8/90/-2. Cervix thicker than last exam  IUPC and FSE placed without difficulty    FHR now 130s with mod. Danya. No accels. Early decels noted  Ctx q 6-8 min. No tachysystole noted  Cat II  MVUs inadequate     O2 applied. Fluid bolus has been infusing     Discussed epidural to facilitate relaxation/descent    Will contemplate

## 2025-02-01 NOTE — ANESTHESIA POSTPROCEDURE EVALUATION
Department of Anesthesiology  Postprocedure Note    Patient: Akua Yao  MRN: [id number]  YOB: 1995  Date of evaluation: 2/1/2025    Procedure Summary       Date: 02/01/25 Room / Location:     Anesthesia Start: 0015 Anesthesia Stop: 1405    Procedure: Labor Analgesia Diagnosis:     Scheduled Providers:  Responsible Provider: Kaila Clemens DO    Anesthesia Type: epidural ASA Status: 2            Anesthesia Type: No value filed.    Yudelka Phase I:      Yudelka Phase II:      Anesthesia Post Evaluation    Patient location during evaluation: bedside  Patient participation: complete - patient participated  Level of consciousness: awake and alert  Pain score: 0  Airway patency: patent  Nausea & Vomiting: no nausea and no vomiting  Cardiovascular status: blood pressure returned to baseline and hemodynamically stable  Respiratory status: acceptable and room air  Hydration status: euvolemic  Multimodal analgesia pain management approach  Pain management: adequate and satisfactory to patient    No notable events documented.

## 2025-02-01 NOTE — PROGRESS NOTES
Has declined recommendation for Pitocin augmentation x2. Has had multiple re-doses of epid. States still painful in R hip. Nauseous.     VSS    Cat I tracing. Mod. Variability overall    VE: 10/100/-1    Offered to begin pushing and discussed Pitocin to increase strength of contractions as well as making them closer and hopefully more effective for descent.     Will consent to this after epidural is re-dosed

## 2025-02-01 NOTE — ANESTHESIA PRE PROCEDURE
Department of Anesthesiology  Preprocedure Note       Name:  Akua Yao   Age:  29 y.o.  :  1995                                          MRN:  [id number]         Date:  2025      Surgeon: * No surgeons listed *    Procedure: * No procedures listed *    Medications prior to admission:   Prior to Admission medications    Medication Sig Start Date End Date Taking? Authorizing Provider   Prenatal Vit w/Hs-Gtrsrjoei-IO (PNV PO) Take 1 tablet by mouth daily   Yes Provider, MD Brandt   ondansetron (ZOFRAN-ODT) 4 MG disintegrating tablet Take 1 tablet by mouth 3 times daily as needed for Nausea or Vomiting  Patient not taking: Reported on 2025   Diego López MD       Current medications:    Current Facility-Administered Medications   Medication Dose Route Frequency Provider Last Rate Last Admin   • ePHEDrine 50 MG/ML injection            • fentaNYL 2 mcg/mL BUPivacaine 0.125% in sodium chloride 0.9% 100 mL epidural infusion  1-15 mL/hr Epidural Continuous Kirill, Mitul C, DO       • naloxone 0.4 mg in 10 mL sodium chloride syringe   IntraVENous PRN Kirill, Mitul C, DO       • ePHEDrine injection 10 mg  10 mg IntraVENous Once PRN Kirill, Mitul C, DO        Followed by   • [START ON 2025] ePHEDrine injection 5 mg  5 mg IntraVENous PRN Kirill, Mitul C, DO       • diphenhydrAMINE (BENADRYL) injection 25 mg  25 mg IntraVENous Q6H PRN Kirill, Mitul C, DO        Or   • diphenhydrAMINE (BENADRYL) capsule 25 mg  25 mg Oral Q6H PRN Kirill, Mitul C, DO       • lactated ringers infusion   IntraVENous Continuous Arlyn Smith APRN -  mL/hr at 25 0440 New Bag at 25 0440   • lactated ringers bolus 500 mL  500 mL IntraVENous PRN Arlyn Smith APRN - [facility]        Or   • lactated ringers bolus 500 mL  500 mL IntraVENous PRN Arlyn Smith APRN - [facility]       • ondansetron (ZOFRAN) injection 4 mg  4 mg IntraVENous Q6H PRN Arlyn Smith APRN - [facility]        Or   • ondansetron

## 2025-02-01 NOTE — PROGRESS NOTES
Asked to come to room to assess pt pushing.  Baby noted to be having a prolonged decel when I arrived.  Quickly introduced myself to pt and her support system.  FHT were in the 80-90s at this time.  Asked for NICU to be called.  Consented pt for a vacuum.  She stated that she previously required a vacuum with one of her other deliveries and understood and accepted the risks and gave permission to proceed.  I briefly reviewed risk of maternal vaginal/perineal injury as well as potential for intracranial or superficial bleeding for baby.  There was no time to empty bladder and in addition, baby was too low to attempt to place catheter pass the head.    Pt started pushing with her next contraction.  Once I was dressed, kiwi vacuum was applied (outlet), placement checked and with one pull vertex delivered.  Restituted to ROP position.  Kiwi was removed with delivery of vertex.  There was a triple nuchal, with only one loop reduced prior to delivery.  Baby was placed on mom's abdomen and cord clamping was only delayed about 15-20 seconds to hand baby off to waiting NICU team as his tone was poor initially.  Terminal mec noted.  Quick inspection of the vagina and perineum showed no lacerations.  A segment of cord was obtained for gases.  I then needed to leave to assess another pt.  This pt was left in the care of ERNA Machado CNM for completion of delivery of placenta and further detailed assessment for any lacerations requiring attention/repair.

## 2025-02-01 NOTE — PROGRESS NOTES
Labor Progress Note    S: Assumed care of pt. In room to introduce myself to patient and review plan for her care. She has been laboring an desires low intervention as well as AROM. Coping well with support at .     Physical Exam:  Patient Vitals for the past 4 hrs:   Temp Pulse Resp BP   25 98.3 °F (36.8 °C) -- -- --   25 98.3 °F (36.8 °C) (!) 103 18 113/70         Cervical Exam: /-2  Palpates OP at this time     Membranes:  AROM for mod. Amount of clear fluid    Fetal Surveillance:  FHR: 120bpm. With intermittent auscultation. Early decel with contractions  Ctx TOCO: q 2-3min. Strong to palpation. No tachysystole noted    Assessment/Plan:    IUP at 39w6d   Spont. Labor  S/p AROM  Cat II tracing  GBS NEG    P: Continue current management/position changes  Anticipate       DARIUS Joyner - [facility]

## 2025-02-01 NOTE — PROGRESS NOTES
In room for eval. Contractions remain spaced and inadequate. Pt reports pain with basal epid. Rate of 12 and multiple boluses via pump and re-doses via anesthesia. Snoring on R side when I entered room  VSS  VE: 10/100/1  Lengthy discussion on need for more effective contractions to facilitate descent. B/R reviewed again with her and her support team.   Agrees to augmentation.

## 2025-02-01 NOTE — ANESTHESIA PROCEDURE NOTES
Epidural Block    Patient location during procedure: OB  Start time: 2/1/2025 12:15 AM  End time: 2/1/2025 12:20 AM  Reason for block: labor epidural  Staffing  Performed: anesthesiologist   Anesthesiologist: Mitul Roy DO  Performed by: Mitul Roy DO  Authorized by: Mitul Roy DO    Epidural  Patient position: sitting  Prep: DuraPrep  Patient monitoring: cardiac monitor, continuous pulse ox and frequent blood pressure checks  Approach: midline  Location: L3-4  Injection technique: ISATU saline  Provider prep: mask and sterile gloves  Needle  Needle type: Tuohy   Needle gauge: 17 G  Needle length: 3.5 in  Needle insertion depth: 5 cm  Catheter type: multi-orifice  Catheter size: 19 G  Catheter at skin depth: 9 cm  Test dose: negativeCatheter Secured: tegaderm and tape  Assessment  Sensory level: T10  Hemodynamics: stable  Attempts: 1  Outcomes: uncomplicated and patient tolerated procedure well  Additional Notes  No passive fluid flow back noted on the catheter. No blood or CSF noted upon aspiration of the catheter.  Preanesthetic Checklist  Completed: patient identified, IV checked, site marked, risks and benefits discussed, surgical/procedural consents, equipment checked, pre-op evaluation, timeout performed, anesthesia consent given, oxygen available, monitors applied/VS acknowledged and fire risk safety assessment completed and verbalized

## 2025-02-01 NOTE — PROGRESS NOTES
Now comfortable with epid.   Ctx remain spaced/inadequate MVUs.   Cat II tracing  VSS    Will consider augmentation if contractions remain inadequate. At this time she would like to rest and remain as low intervention as possible

## 2025-02-01 NOTE — PROGRESS NOTES
@0740 Bedside shift change report given to ERNA Hathaway RN (oncoming nurse) by JULES Pond RN (offgoing nurse). Report included the following information Nurse Handoff Report, Index, Intake/Output, MAR, and Recent Results.      @0745 Discussing plan of care with patient and her support team. She feels exhausted, and is worried that she might need a  section. Explained rationale behind needing some augmentation of labor. Offered epidural replacement. Patient declines epidural replacement at this time. Would like to discuss pitocin further with her family before starting it. Patient to call RN when she is ready for pitocin.     @0804 Called back in to patient's room. She states she wants to discuss the possibility of a  section. States she can barely keep her eyes open and does not believe she has the strength to push. States \"she tried to push a minute ago and couldn't do it.\" Encouraged patient to try to push with me/provider before we make that decision. Also emphasized that her contraction pattern is still not adequate and if we could achieve that it might give her a better chance of effectively pushing. Still having discomfort on her right side. Patient turned to right side, bolus button pushed. Will have her push it again in 15 minutes to try to make that spot more comfortable while we wait for providers to come speak to patient and discuss plan of care.     @0825 Patient sleeping comfortably on her right side.     @0845 Went in to talk to patient discussing pitocin now that she is comfortable. She would neither accept nor decline at this time. Peanut ball between calves, knees together.    @0903 SVE C/C/0 to +1    @0910 ERNA Machado CNM at bedside discussing plan of care. Patient and support team are now amenable to starting pitocin to augment labor. Patient turned to left side. Bolus button pushed. Peanut ball placed between calves, closed knees.     @0940 Patient not feeling much better with

## 2025-02-02 VITALS
OXYGEN SATURATION: 98 % | BODY MASS INDEX: 36.63 KG/M2 | HEIGHT: 61 IN | WEIGHT: 194 LBS | HEART RATE: 73 BPM | DIASTOLIC BLOOD PRESSURE: 79 MMHG | RESPIRATION RATE: 16 BRPM | TEMPERATURE: 97.9 F | SYSTOLIC BLOOD PRESSURE: 126 MMHG

## 2025-02-02 PROBLEM — O47.9 UTERINE CONTRACTIONS: Status: RESOLVED | Noted: 2025-01-30 | Resolved: 2025-02-02

## 2025-02-02 PROCEDURE — 6370000000 HC RX 637 (ALT 250 FOR IP): Performed by: MIDWIFE

## 2025-02-02 RX ORDER — IBUPROFEN 800 MG/1
800 TABLET, FILM COATED ORAL EVERY 8 HOURS SCHEDULED
Qty: 120 TABLET | Refills: 3 | Status: SHIPPED | OUTPATIENT
Start: 2025-02-02

## 2025-02-02 RX ORDER — ACETAMINOPHEN 500 MG
1000 TABLET ORAL EVERY 8 HOURS SCHEDULED
Qty: 120 TABLET | Refills: 3 | Status: SHIPPED | OUTPATIENT
Start: 2025-02-02

## 2025-02-02 RX ADMIN — IBUPROFEN 800 MG: 400 TABLET, FILM COATED ORAL at 08:25

## 2025-02-02 RX ADMIN — ACETAMINOPHEN 1000 MG: 500 TABLET ORAL at 14:42

## 2025-02-02 RX ADMIN — DOCUSATE SODIUM 100 MG: 100 CAPSULE, LIQUID FILLED ORAL at 08:25

## 2025-02-02 ASSESSMENT — PAIN DESCRIPTION - DESCRIPTORS: DESCRIPTORS: CRAMPING

## 2025-02-02 ASSESSMENT — PAIN - FUNCTIONAL ASSESSMENT: PAIN_FUNCTIONAL_ASSESSMENT: ACTIVITIES ARE NOT PREVENTED

## 2025-02-02 ASSESSMENT — PAIN DESCRIPTION - ORIENTATION: ORIENTATION: LOWER

## 2025-02-02 ASSESSMENT — PAIN DESCRIPTION - LOCATION: LOCATION: ABDOMEN

## 2025-02-02 ASSESSMENT — PAIN SCALES - GENERAL: PAINLEVEL_OUTOF10: 3

## 2025-02-02 NOTE — PROGRESS NOTES
Post-Partum Day Number 1 Progress Note    Akua Yao     Information for the patient's :  SIMIN Yao [[id number]]   Vaginal, Vacuum (Extractor) Patient doing well without significant complaint.  Voiding without difficulty, normal lochia. Desires early discharge.  Breastfeeding.    Vitals:    Vitals:    25 0821   BP: 126/79   Pulse: 73   Resp: 16   Temp: 97.9 °F (36.6 °C)   SpO2: 98%     Temp (24hrs), Av.1 °F (36.7 °C), Min:97.9 °F (36.6 °C), Max:98.4 °F (36.9 °C)          Exam:  Patient without distress.                Abdomen soft, fundus firm, nontender                Perineum with normal lochia noted.                Lower extremities are negative for swelling, cords or tenderness.    Breasts soft, nontender    Labs:     Lab Results   Component Value Date/Time    WBC 7.5 2025 05:53 PM    WBC 8.5 2024 10:11 PM    HGB 11.8 2025 05:53 PM    HGB 12.9 2024 10:11 PM    HCT 37.4 2025 05:53 PM    HCT 38.4 2024 10:11 PM     2025 05:53 PM     2024 10:11 PM       No results found for this or any previous visit (from the past 24 hour(s)).    Assessment: Doing well, post partum day 1      Plan:  1. Continue routine postpartum and perineal care as well as maternal education.  2. DC to home if  cleared by peds.

## 2025-02-02 NOTE — LACTATION NOTE
This note was copied from a baby's chart.  Mom had bilateral breast reduction in Jan 2023. Her nipple placement is noted to be high on the breast, facing a more superior direction. Assisted mom with positioning and latching infant in the football position. He latched well with rhythmic sucking noted. Mom desires discharge today. She has a pump at home and plans to pump as well as nurse  to facilitate milk production, due to breast reduction and previously lower supply.

## 2025-02-02 NOTE — DISCHARGE SUMMARY
Obstetrical Discharge Summary     Name: Akua Yao MRN: [id number]  SSN: [id number]    YOB: 1995  Age: 29 y.o.  Sex: female      Allergies: Pork-derived products and Shellfish-derived products    Admit Date: 2025    Discharge Date: 2025     Admitting Physician: Yesenia Drew MD     Attending Physician:  Jose Veliz MD     * Admission Diagnoses: Normal labor and delivery [O80]  Uterine contractions [O47.9]    * Discharge Diagnoses:   Information for the patient's :  SIMIN Yao [[id number]]   male   @BIRTHWEIGHT)@   Information for the patient's :  SIMIN Yao [[id number]]   2025  Information for the patient's :  SIMIN Yao [[id number]]   APGAR One: 7   Information for the patient's :  SIMIN Yao [[id number]]   APGAR Five: 9      Additional Diagnoses:   Principal Problem (Resolved):    Normal labor and delivery  Active Problems:    * No active hospital problems. *  Resolved Problems:    Uterine contractions     Lab Results   Component Value Date/Time    ABORH O POSITIVE 2025 05:53 PM      There is no immunization history for the selected administration types on file for this patient.    * Procedures: VAVD      * Discharge Condition: Good and Stable    * Hospital Course: Normal hospital course following the delivery.    * Disposition: Home    Discharge Medications:      Medication List        START taking these medications      acetaminophen 500 MG tablet  Commonly known as: TYLENOL  Take 2 tablets by mouth every 8 hours     ibuprofen 800 MG tablet  Commonly known as: ADVIL;MOTRIN  Take 1 tablet by mouth every 8 hours            CONTINUE taking these medications      PNV PO            STOP taking these medications      ondansetron 4 MG disintegrating tablet  Commonly known as: ZOFRAN-ODT               Where to Get Your Medications        These medications were sent to [facility] - [facility] [place] [address]

## 2025-02-02 NOTE — DISCHARGE INSTRUCTIONS
Postpartum Support Groups   We know that all of us are dealing with a tremendous amount of uncertainty, confusion and disruption to our daily lives, which may result in increased anxiety, depression and fear. If you are feeling unsettled or worse, please know that we are here to help. During this time of increased caution and care for one another, [facility] [place] ([facility]) is offering virtual and in person support groups to ALL MOTHERS in [place] regardless of the age of your child/children as a way to help weather this emotional storm together. Social support is an important part of self-care during this time of physical distancing.  Virtual postpartum support group meetings available at www.[facility]partum[facility].org  Warm Line: 713.820.6139    Breastfeeding Support Groups   1st and 3rd Wednesday of each month at [facility]  2nd and 4th Tuesday of each month at [facility] (in education center behind cafeteria)    [email or web address]